# Patient Record
Sex: MALE
[De-identification: names, ages, dates, MRNs, and addresses within clinical notes are randomized per-mention and may not be internally consistent; named-entity substitution may affect disease eponyms.]

---

## 2022-06-24 PROBLEM — Z00.00 ENCOUNTER FOR PREVENTIVE HEALTH EXAMINATION: Status: ACTIVE | Noted: 2022-06-24

## 2022-06-27 ENCOUNTER — APPOINTMENT (OUTPATIENT)
Dept: PEDIATRIC ORTHOPEDIC SURGERY | Facility: CLINIC | Age: 72
End: 2022-06-27

## 2022-06-27 VITALS — WEIGHT: 160 LBS | BODY MASS INDEX: 23.7 KG/M2 | HEIGHT: 69 IN

## 2022-06-27 DIAGNOSIS — Z82.49 FAMILY HISTORY OF ISCHEMIC HEART DISEASE AND OTHER DISEASES OF THE CIRCULATORY SYSTEM: ICD-10-CM

## 2022-06-27 PROCEDURE — 99203 OFFICE O/P NEW LOW 30 MIN: CPT

## 2022-06-27 PROCEDURE — 73560 X-RAY EXAM OF KNEE 1 OR 2: CPT

## 2022-06-27 PROCEDURE — 72100 X-RAY EXAM L-S SPINE 2/3 VWS: CPT | Mod: 26

## 2022-06-27 RX ORDER — DICLOFENAC SODIUM 50 MG/1
50 TABLET, DELAYED RELEASE ORAL TWICE DAILY
Qty: 30 | Refills: 2 | Status: ACTIVE | COMMUNITY
Start: 2022-06-27 | End: 1900-01-01
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
## 2022-06-27 NOTE — DATA REVIEWED
[de-identified] : X-ray evaluation of the right knee standing (AP and lateral views) on 6/27/2022 reveals degenerative changes in the medial compartment.\par \par Review of x-rays from [facility] of the lumbosacral spine dated 6/22/2022 reveals advanced degenerative arthritis with degenerative scoliosis.

## 2022-06-27 NOTE — PHYSICAL EXAM
[FreeTextEntry1] : On physical examination the patient has limited forward flexion of the lumbar spine.  He cannot extend the lumbar spine.  Right and left lateral bending increases his back pain.  Straight leg raising is positive on the right at 30 degrees and positive on the left at 40 degrees.  Motor or sensory and deep tendon reflex examination of both lower extremities is within normal limits.\par Examination of the right knee reveals medial joint line tenderness.  There is no knee effusion and no varus valgus or AP instability.
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
## 2022-07-18 ENCOUNTER — APPOINTMENT (OUTPATIENT)
Dept: PEDIATRIC ORTHOPEDIC SURGERY | Facility: CLINIC | Age: 72
End: 2022-07-18

## 2022-07-18 VITALS — WEIGHT: 160 LBS | BODY MASS INDEX: 23.7 KG/M2 | HEIGHT: 69 IN

## 2022-07-18 DIAGNOSIS — M17.11 UNILATERAL PRIMARY OSTEOARTHRITIS, RIGHT KNEE: ICD-10-CM

## 2022-07-18 DIAGNOSIS — M21.541 ACQUIRED CLUBFOOT, RIGHT FOOT: ICD-10-CM

## 2022-07-18 PROCEDURE — 99214 OFFICE O/P EST MOD 30 MIN: CPT | Mod: 25

## 2022-07-18 PROCEDURE — 20552 NJX 1/MLT TRIGGER POINT 1/2: CPT

## 2022-07-19 PROBLEM — M17.11 PRIMARY OSTEOARTHRITIS OF RIGHT KNEE: Status: ACTIVE | Noted: 2022-06-27

## 2022-07-19 NOTE — ASSESSMENT
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  It is likely he will require epidural injections and possibly surgical intervention.  These modalities have been discussed with him and his wife.\par \par Because the right knee has remained quite painful making it difficult for him to ambulate and getting up from a seated position we have seeking authorization for an MRI of the right knee without contrast.

## 2022-07-19 NOTE — PHYSICAL EXAM
[de-identified] : On physical examination the patient has significant right lumbar muscle spasm and tenderness.  He has marked decrease in range of motion of the lumbar spine in all planes.  Straight leg raising is positive on the right at 30 degrees.  Motor and sensory and deep tendon reflex examination of both lower extremities is within normal limits.  Examination of the right knee reveals crepitus on range of motion.  The range of motion is from -5 degrees of full extension to 115 degrees of flexion.  There is no knee effusion and no varus valgus or AP instability.  There is both medial and lateral joint line tenderness.

## 2022-07-19 NOTE — PROCEDURE
[de-identified] : The right lumbar musculature has been injected with 1 mL of 1% plain lidocaine and 40 mg of Kenalog (trigger point injection)

## 2022-07-19 NOTE — HISTORY OF PRESENT ILLNESS
[de-identified] : This 71-year-old male returns with significant back and now right leg pain.  This patient has not responded to anti-inflammatory medication, home physical therapy as well as cortisone injection.  The patient has significant difficulty with activities of daily living.  He can barely ambulate.

## 2022-09-09 ENCOUNTER — APPOINTMENT (OUTPATIENT)
Dept: PEDIATRIC ORTHOPEDIC SURGERY | Facility: CLINIC | Age: 72
End: 2022-09-09

## 2022-09-09 VITALS — WEIGHT: 160 LBS | HEIGHT: 69 IN | BODY MASS INDEX: 23.7 KG/M2

## 2022-09-09 DIAGNOSIS — M79.18 MYALGIA, OTHER SITE: ICD-10-CM

## 2022-09-09 DIAGNOSIS — M75.41 IMPINGEMENT SYNDROME OF RIGHT SHOULDER: ICD-10-CM

## 2022-09-09 PROCEDURE — 99213 OFFICE O/P EST LOW 20 MIN: CPT

## 2022-09-09 PROCEDURE — 73030 X-RAY EXAM OF SHOULDER: CPT | Mod: 50

## 2022-09-12 PROBLEM — M79.18 LUMBAR MUSCLE PAIN: Status: ACTIVE | Noted: 2022-07-18

## 2022-09-12 PROBLEM — M75.41 IMPINGEMENT SYNDROME OF RIGHT SHOULDER: Status: ACTIVE | Noted: 2022-09-12

## 2022-09-12 NOTE — ASSESSMENT
[FreeTextEntry1] : Impingement syndrome right and left shoulders\par DJD LS spine\par \par The patient will be treated with diclofenac.  He will return in 1 month after doing physical therapy for the shoulders and continuing therapy for his spine.  If he is not improving especially with regards to the shoulders he will undergo an MRI of the shoulders.

## 2022-09-12 NOTE — DATA REVIEWED
[de-identified] : X-ray evaluation of right and left shoulders on 9/9/2022 (AP and lateral views) reveals degenerative changes in the acromioclavicular joints bilaterally.

## 2022-09-12 NOTE — PHYSICAL EXAM
[FreeTextEntry1] : On physical examination patient has tenderness in the anterior deltoid versus of both shoulders.  He has significant difficulty with abduction and forward flexion past 75 degrees.  There is crepitus on range of motion of each shoulder.  There is no AP instability and a negative sulcus sign.  Examination of the lumbar spine reveals significant tenderness especially in the right lumbar musculature.  Right lateral bending and rotation increases his back pain.  Straight leg raising is negative bilaterally.  Motor sensory and deep tendon reflex examination of both lower extremities is within normal limits.
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
## 2022-10-07 ENCOUNTER — APPOINTMENT (OUTPATIENT)
Dept: PEDIATRIC ORTHOPEDIC SURGERY | Facility: CLINIC | Age: 72
End: 2022-10-07

## 2022-10-07 VITALS — WEIGHT: 160 LBS | HEIGHT: 69 IN | BODY MASS INDEX: 23.7 KG/M2

## 2022-10-07 DIAGNOSIS — M54.16 RADICULOPATHY, LUMBAR REGION: ICD-10-CM

## 2022-10-07 DIAGNOSIS — M75.42 IMPINGEMENT SYNDROME OF LEFT SHOULDER: ICD-10-CM

## 2022-10-07 DIAGNOSIS — M47.26 OTHER SPONDYLOSIS WITH RADICULOPATHY, LUMBAR REGION: ICD-10-CM

## 2022-10-07 PROCEDURE — 99213 OFFICE O/P EST LOW 20 MIN: CPT

## 2022-10-09 PROBLEM — M75.42 IMPINGEMENT SYNDROME OF LEFT SHOULDER: Status: ACTIVE | Noted: 2022-09-12

## 2022-10-10 PROBLEM — M54.16 LUMBAR RADICULOPATHY, RIGHT: Status: ACTIVE | Noted: 2022-07-18

## 2022-10-10 PROBLEM — M47.26 OSTEOARTHRITIS OF SPINE WITH RADICULOPATHY, LUMBAR REGION: Status: ACTIVE | Noted: 2022-10-10

## 2022-10-10 NOTE — ASSESSMENT
[FreeTextEntry1] : Impingement, left shoulder. Rule out rotator cuff tear\par DJD LS spine with right lumbar radiculitis\par \par We have seeking authorization for an MRI of the left shoulder because he has failed to respond to conservative treatment as well as his increasing pain of the left shoulder.

## 2022-10-10 NOTE — HISTORY OF PRESENT ILLNESS
[FreeTextEntry1] : This 72-year-old male returns for reevaluation of rotator cuff tear of the left shoulder as well as right lumbar radiculitis with degenerative arthritis of the lumbar spine.  He is most concerned at this time about the significant pain he has in the left shoulder.  He has difficulty with forward flexion abduction and external rotation.  He recently had epidural injection and he is under the care of a pain management physician.

## 2022-10-10 NOTE — PHYSICAL EXAM
[FreeTextEntry1] : On physical examination he has restriction of motion of the left shoulder especially forward flexion abduction and external rotation.  There is marked tenderness in the anterior deltoid bursa and bicipital groove.  Motor or sensory and deep tendon reflex examination of both upper extremities is intact.